# Patient Record
Sex: MALE | Race: WHITE | NOT HISPANIC OR LATINO | Employment: OTHER | URBAN - METROPOLITAN AREA
[De-identification: names, ages, dates, MRNs, and addresses within clinical notes are randomized per-mention and may not be internally consistent; named-entity substitution may affect disease eponyms.]

---

## 2020-10-09 ENCOUNTER — TRANSCRIBE ORDERS (OUTPATIENT)
Dept: ADMINISTRATIVE | Facility: HOSPITAL | Age: 80
End: 2020-10-09

## 2020-10-09 ENCOUNTER — LAB (OUTPATIENT)
Dept: LAB | Facility: HOSPITAL | Age: 80
End: 2020-10-09
Attending: INTERNAL MEDICINE
Payer: MEDICARE

## 2020-10-09 DIAGNOSIS — E78.2 MIXED HYPERLIPIDEMIA: ICD-10-CM

## 2020-10-09 DIAGNOSIS — M79.10 MYALGIA: ICD-10-CM

## 2020-10-09 DIAGNOSIS — Z12.5 SPECIAL SCREENING FOR MALIGNANT NEOPLASM OF PROSTATE: ICD-10-CM

## 2020-10-09 DIAGNOSIS — Z79.01 LONG TERM (CURRENT) USE OF ANTICOAGULANTS: Primary | ICD-10-CM

## 2020-10-09 DIAGNOSIS — Z79.01 LONG TERM (CURRENT) USE OF ANTICOAGULANTS: ICD-10-CM

## 2020-10-09 LAB
ALBUMIN SERPL BCP-MCNC: 3.5 G/DL (ref 3.4–4.8)
ALP SERPL-CCNC: 86.2 U/L (ref 10–129)
ALT SERPL W P-5'-P-CCNC: 71 U/L (ref 5–63)
ANION GAP SERPL CALCULATED.3IONS-SCNC: 9 MMOL/L (ref 4–13)
AST SERPL W P-5'-P-CCNC: 40 U/L (ref 15–41)
BACTERIA UR QL AUTO: ABNORMAL /HPF
BASOPHILS # BLD MANUAL: 0 THOUSAND/UL (ref 0–0.1)
BASOPHILS NFR MAR MANUAL: 0 % (ref 0–1)
BILIRUB SERPL-MCNC: 0.44 MG/DL (ref 0.3–1.2)
BILIRUB UR QL STRIP: NEGATIVE
BUN SERPL-MCNC: 17 MG/DL (ref 6–20)
CALCIUM SERPL-MCNC: 9 MG/DL (ref 8.4–10.2)
CHLORIDE SERPL-SCNC: 99 MMOL/L (ref 96–108)
CHOLEST SERPL-MCNC: 130 MG/DL
CLARITY UR: CLEAR
CO2 SERPL-SCNC: 27 MMOL/L (ref 22–33)
COLOR UR: YELLOW
CREAT SERPL-MCNC: 0.82 MG/DL (ref 0.5–1.2)
EOSINOPHIL # BLD MANUAL: 0 THOUSAND/UL (ref 0–0.4)
EOSINOPHIL NFR BLD MANUAL: 0 % (ref 0–6)
ERYTHROCYTE [DISTWIDTH] IN BLOOD BY AUTOMATED COUNT: 12.9 % (ref 11.6–15.1)
ERYTHROCYTE [SEDIMENTATION RATE] IN BLOOD: 32 MM/HOUR (ref 0–20)
GFR SERPL CREATININE-BSD FRML MDRD: 83 ML/MIN/1.73SQ M
GLUCOSE P FAST SERPL-MCNC: 105 MG/DL (ref 70–100)
GLUCOSE UR STRIP-MCNC: NEGATIVE MG/DL
HCT VFR BLD AUTO: 42 % (ref 36.5–49.3)
HDLC SERPL-MCNC: 19 MG/DL
HGB BLD-MCNC: 14 G/DL (ref 12–17)
HGB UR QL STRIP.AUTO: ABNORMAL
KETONES UR STRIP-MCNC: NEGATIVE MG/DL
LDLC SERPL CALC-MCNC: 85 MG/DL (ref 0–100)
LEUKOCYTE ESTERASE UR QL STRIP: ABNORMAL
LYMPHOCYTES # BLD AUTO: 1.24 THOUSAND/UL (ref 0.6–4.47)
LYMPHOCYTES # BLD AUTO: 11 % (ref 14–44)
MCH RBC QN AUTO: 29.6 PG (ref 26.8–34.3)
MCHC RBC AUTO-ENTMCNC: 33.3 G/DL (ref 31.4–37.4)
MCV RBC AUTO: 89 FL (ref 82–98)
METAMYELOCYTES NFR BLD MANUAL: 1 % (ref 0–1)
MONOCYTES # BLD AUTO: 1.58 THOUSAND/UL (ref 0–1.22)
MONOCYTES NFR BLD: 14 % (ref 4–12)
NEUTROPHILS # BLD MANUAL: 8.34 THOUSAND/UL (ref 1.85–7.62)
NEUTS BAND NFR BLD MANUAL: 2 % (ref 0–8)
NEUTS SEG NFR BLD AUTO: 72 % (ref 43–75)
NITRITE UR QL STRIP: NEGATIVE
NON-SQ EPI CELLS URNS QL MICRO: ABNORMAL /HPF
NONHDLC SERPL-MCNC: 111 MG/DL
PH UR STRIP.AUTO: 7 [PH]
PLATELET BLD QL SMEAR: ADEQUATE
PMV BLD AUTO: 11 FL (ref 8.9–12.7)
POTASSIUM SERPL-SCNC: 4.6 MMOL/L (ref 3.5–5)
PROT SERPL-MCNC: 6.3 G/DL (ref 6.4–8.3)
PROT UR STRIP-MCNC: ABNORMAL MG/DL
RBC # BLD AUTO: 4.73 MILLION/UL (ref 3.88–5.62)
RBC #/AREA URNS AUTO: ABNORMAL /HPF
RBC MORPH BLD: NORMAL
SODIUM SERPL-SCNC: 135 MMOL/L (ref 133–145)
SP GR UR STRIP.AUTO: 1.01 (ref 1–1.03)
T3 SERPL-MCNC: 0.8 NG/ML (ref 0.6–1.8)
TOTAL CELLS COUNTED SPEC: 100
TRIGL SERPL-MCNC: 128.3 MG/DL
TSH SERPL DL<=0.05 MIU/L-ACNC: 1.75 UIU/ML (ref 0.34–5.6)
UROBILINOGEN UR QL STRIP.AUTO: 0.2 E.U./DL
WBC # BLD AUTO: 11.27 THOUSAND/UL (ref 4.31–10.16)
WBC #/AREA URNS AUTO: ABNORMAL /HPF

## 2020-10-09 PROCEDURE — 86430 RHEUMATOID FACTOR TEST QUAL: CPT

## 2020-10-09 PROCEDURE — 80053 COMPREHEN METABOLIC PANEL: CPT

## 2020-10-09 PROCEDURE — 80061 LIPID PANEL: CPT

## 2020-10-09 PROCEDURE — 86038 ANTINUCLEAR ANTIBODIES: CPT

## 2020-10-09 PROCEDURE — 85007 BL SMEAR W/DIFF WBC COUNT: CPT

## 2020-10-09 PROCEDURE — 85027 COMPLETE CBC AUTOMATED: CPT

## 2020-10-09 PROCEDURE — 84443 ASSAY THYROID STIM HORMONE: CPT

## 2020-10-09 PROCEDURE — 84480 ASSAY TRIIODOTHYRONINE (T3): CPT

## 2020-10-09 PROCEDURE — 86618 LYME DISEASE ANTIBODY: CPT

## 2020-10-09 PROCEDURE — 81001 URINALYSIS AUTO W/SCOPE: CPT | Performed by: INTERNAL MEDICINE

## 2020-10-09 PROCEDURE — 85652 RBC SED RATE AUTOMATED: CPT

## 2020-10-09 PROCEDURE — 36415 COLL VENOUS BLD VENIPUNCTURE: CPT

## 2020-10-10 LAB — B BURGDOR IGG+IGM SER-ACNC: <0.91 ISR (ref 0–0.9)

## 2020-10-12 LAB
RHEUMATOID FACT SER QL LA: NEGATIVE
RYE IGE QN: NEGATIVE

## 2020-12-22 ENCOUNTER — TRANSCRIBE ORDERS (OUTPATIENT)
Dept: ADMINISTRATIVE | Facility: HOSPITAL | Age: 80
End: 2020-12-22

## 2020-12-22 ENCOUNTER — APPOINTMENT (OUTPATIENT)
Dept: LAB | Facility: HOSPITAL | Age: 80
End: 2020-12-22
Payer: MEDICARE

## 2020-12-22 ENCOUNTER — APPOINTMENT (OUTPATIENT)
Dept: CT IMAGING | Facility: HOSPITAL | Age: 80
End: 2020-12-22
Payer: MEDICARE

## 2020-12-22 DIAGNOSIS — C67.9 MALIGNANT NEOPLASM OF URINARY BLADDER, UNSPECIFIED SITE (HCC): Primary | ICD-10-CM

## 2020-12-22 DIAGNOSIS — C67.9 MALIGNANT NEOPLASM OF BLADDER, UNSPECIFIED (HCC): Primary | ICD-10-CM

## 2020-12-22 DIAGNOSIS — C67.9 MALIGNANT NEOPLASM OF URINARY BLADDER, UNSPECIFIED SITE (HCC): ICD-10-CM

## 2020-12-22 LAB
BUN SERPL-MCNC: 19 MG/DL (ref 6–20)
CREAT SERPL-MCNC: 0.99 MG/DL (ref 0.5–1.2)
GFR SERPL CREATININE-BSD FRML MDRD: 71 ML/MIN/1.73SQ M

## 2020-12-22 PROCEDURE — 84520 ASSAY OF UREA NITROGEN: CPT

## 2020-12-22 PROCEDURE — 36415 COLL VENOUS BLD VENIPUNCTURE: CPT

## 2020-12-22 PROCEDURE — 82565 ASSAY OF CREATININE: CPT

## 2020-12-29 ENCOUNTER — HOSPITAL ENCOUNTER (OUTPATIENT)
Dept: CT IMAGING | Facility: HOSPITAL | Age: 80
Discharge: HOME | End: 2020-12-29
Payer: MEDICARE

## 2020-12-29 DIAGNOSIS — C67.9 MALIGNANT NEOPLASM OF BLADDER, UNSPECIFIED (HCC): ICD-10-CM

## 2020-12-29 PROCEDURE — G1004 CDSM NDSC: HCPCS

## 2020-12-29 PROCEDURE — 74177 CT ABD & PELVIS W/CONTRAST: CPT

## 2020-12-29 RX ADMIN — IOHEXOL 100 ML: 350 INJECTION, SOLUTION INTRAVENOUS at 08:18

## 2021-01-26 ENCOUNTER — TRANSCRIBE ORDERS (OUTPATIENT)
Dept: ADMINISTRATIVE | Facility: HOSPITAL | Age: 81
End: 2021-01-26

## 2021-01-26 DIAGNOSIS — C67.9 MALIGNANT NEOPLASM OF URINARY BLADDER, UNSPECIFIED SITE (HCC): ICD-10-CM

## 2021-01-26 DIAGNOSIS — Z01.818 PREOP TESTING: ICD-10-CM

## 2021-01-26 DIAGNOSIS — D41.4 NEOPLASM OF UNCERTAIN BEHAVIOR OF BLADDER: Primary | ICD-10-CM

## 2021-01-29 ENCOUNTER — LAB (OUTPATIENT)
Dept: LAB | Facility: CLINIC | Age: 81
End: 2021-01-29
Payer: MEDICARE

## 2021-01-29 ENCOUNTER — APPOINTMENT (OUTPATIENT)
Dept: LAB | Facility: CLINIC | Age: 81
End: 2021-01-29
Payer: MEDICARE

## 2021-01-29 DIAGNOSIS — Z01.818 PREOP TESTING: ICD-10-CM

## 2021-01-29 DIAGNOSIS — U07.1 COVID-19: ICD-10-CM

## 2021-01-29 DIAGNOSIS — D41.4 NEOPLASM OF UNCERTAIN BEHAVIOR OF BLADDER: ICD-10-CM

## 2021-01-29 LAB
ANION GAP SERPL CALCULATED.3IONS-SCNC: 7 MMOL/L (ref 4–13)
ATRIAL RATE: 97 BPM
BUN SERPL-MCNC: 18 MG/DL (ref 5–25)
CALCIUM SERPL-MCNC: 9.3 MG/DL (ref 8.3–10.1)
CHLORIDE SERPL-SCNC: 96 MMOL/L (ref 100–108)
CO2 SERPL-SCNC: 30 MMOL/L (ref 21–32)
CREAT SERPL-MCNC: 0.98 MG/DL (ref 0.6–1.3)
ERYTHROCYTE [DISTWIDTH] IN BLOOD BY AUTOMATED COUNT: 12.7 % (ref 11.6–15.1)
GFR SERPL CREATININE-BSD FRML MDRD: 72 ML/MIN/1.73SQ M
GLUCOSE SERPL-MCNC: 95 MG/DL (ref 65–140)
HCT VFR BLD AUTO: 44.5 % (ref 36.5–49.3)
HGB BLD-MCNC: 14.6 G/DL (ref 12–17)
MCH RBC QN AUTO: 28.9 PG (ref 26.8–34.3)
MCHC RBC AUTO-ENTMCNC: 32.8 G/DL (ref 31.4–37.4)
MCV RBC AUTO: 88 FL (ref 82–98)
PLATELET # BLD AUTO: 251 THOUSANDS/UL (ref 149–390)
PMV BLD AUTO: 9.3 FL (ref 8.9–12.7)
POTASSIUM SERPL-SCNC: 4.2 MMOL/L (ref 3.5–5.3)
QRS AXIS: -63 DEGREES
QRSD INTERVAL: 130 MS
QT INTERVAL: 318 MS
QTC INTERVAL: 420 MS
RBC # BLD AUTO: 5.06 MILLION/UL (ref 3.88–5.62)
SODIUM SERPL-SCNC: 133 MMOL/L (ref 136–145)
T WAVE AXIS: 31 DEGREES
VENTRICULAR RATE: 105 BPM
WBC # BLD AUTO: 11.19 THOUSAND/UL (ref 4.31–10.16)

## 2021-01-29 PROCEDURE — U0005 INFEC AGEN DETEC AMPLI PROBE: HCPCS | Performed by: SPECIALIST

## 2021-01-29 PROCEDURE — U0003 INFECTIOUS AGENT DETECTION BY NUCLEIC ACID (DNA OR RNA); SEVERE ACUTE RESPIRATORY SYNDROME CORONAVIRUS 2 (SARS-COV-2) (CORONAVIRUS DISEASE [COVID-19]), AMPLIFIED PROBE TECHNIQUE, MAKING USE OF HIGH THROUGHPUT TECHNOLOGIES AS DESCRIBED BY CMS-2020-01-R: HCPCS | Performed by: SPECIALIST

## 2021-01-29 PROCEDURE — 85027 COMPLETE CBC AUTOMATED: CPT

## 2021-01-29 PROCEDURE — 36415 COLL VENOUS BLD VENIPUNCTURE: CPT

## 2021-01-29 PROCEDURE — 93005 ELECTROCARDIOGRAM TRACING: CPT

## 2021-01-29 PROCEDURE — 93010 ELECTROCARDIOGRAM REPORT: CPT | Performed by: INTERNAL MEDICINE

## 2021-01-29 PROCEDURE — 80048 BASIC METABOLIC PNL TOTAL CA: CPT

## 2021-01-29 RX ORDER — ASPIRIN 81 MG/1
81 TABLET ORAL DAILY
COMMUNITY
End: 2021-07-11

## 2021-01-29 RX ORDER — METOPROLOL TARTRATE 50 MG/1
50 TABLET, FILM COATED ORAL EVERY 12 HOURS SCHEDULED
COMMUNITY

## 2021-01-29 RX ORDER — OMEGA-3S/DHA/EPA/FISH OIL/D3 300MG-1000
400 CAPSULE ORAL DAILY
COMMUNITY
End: 2021-07-11

## 2021-01-29 RX ORDER — LISINOPRIL AND HYDROCHLOROTHIAZIDE 20; 12.5 MG/1; MG/1
1 TABLET ORAL DAILY
COMMUNITY
End: 2021-07-11

## 2021-01-29 RX ORDER — ATORVASTATIN CALCIUM 40 MG/1
40 TABLET, FILM COATED ORAL DAILY
COMMUNITY
End: 2021-07-11

## 2021-01-29 RX ORDER — FERROUS SULFATE 325(65) MG
325 TABLET ORAL
COMMUNITY

## 2021-01-29 NOTE — PRE-PROCEDURE INSTRUCTIONS
My Surgical Experience    The following information was developed to assist you to prepare for your operation  What do I need to do before coming to the hospital?   Arrange for a responsible person to drive you to and from the hospital    Arrange care for your children at home  Children are not allowed in the recovery areas of the hospital   Plan to wear clothing that is easy to put on and take off  If you are having shoulder surgery, wear a shirt that buttons or zippers in the front  Bathing  o Shower the evening before and the morning of your surgery with an antibacterial soap  Please refer to the Pre Op Showering Instructions for Surgery Patients Sheet   o Remove nail polish and all body piercing jewelry  o Do not shave any body part for at least 24 hours before surgery-this includes face, arms, legs and upper body  Food  o Nothing to eat or drink after midnight the night before your surgery  This includes candy and chewing gum  o Exception: If your surgery is after 12:00pm (noon), you may have clear liquids such as 7-Up®, ginger ale, apple or cranberry juice, Jell-O®, water, or clear broth until 8:00 am  o Do not drink milk or juice with pulp on the morning before surgery  o Do not drink alcohol 24 hours before surgery  Medicine  o Follow instructions you received from your surgeon about which medicines you may take on the day of surgery  o If instructed to take medicine on the morning of surgery, take pills with just a small sip of water  Call your prescribing doctor for specific infroamtion on what to do if you take insulin    What should I bring to the hospital?    Bring:  Karla Blanton or a walker, if you have them, for foot or knee surgery   A list of the daily medicines, vitamins, minerals, herbals and nutritional supplements you take   Include the dosages of medicines and the time you take them each day   Glasses, dentures or hearing aids   Minimal clothing; you will be wearing hospital sleepwear   Photo ID; required to verify your identity   If you have a Living Will or Power of , bring a copy of the documents   If you have an ostomy, bring an extra pouch and any supplies you use    Do not bring   Medicines or inhalers   Money, valuables or jewelry    What other information should I know about the day of surgery?  Notify your surgeons if you develop a cold, sore throat, cough, fever, rash or any other illness   Report to the Ambulatory Surgical/Same Day Surgery Unit   You will be instructed to stop at Registration only if you have not been pre-registered   Inform your  fi they do not stay that they will be asked by the staff to leave a phone number where they can be reached   Be available to be reached before surgery  In the event the operating room schedule changes, you may be asked to come in earlier or later than expected    *It is important to tell your doctor and others involved in your health care if you are taking or have been taking any non-prescription drugs, vitamins, minerals, herbals or other nutritional supplements  Any of these may interact with some food or medicines and cause a reaction      Pre-Surgery Instructions:   Medication Instructions    aspirin (ECOTRIN LOW STRENGTH) 81 mg EC tablet Instructed patient per Anesthesia Guidelines   atorvastatin (LIPITOR) 40 mg tablet Instructed patient per Anesthesia Guidelines   cholecalciferol (VITAMIN D3) 400 units tablet Instructed patient per Anesthesia Guidelines   ferrous sulfate 325 (65 Fe) mg tablet Instructed patient per Anesthesia Guidelines   lisinopril-hydrochlorothiazide (PRINZIDE,ZESTORETIC) 20-12 5 MG per tablet Instructed patient per Anesthesia Guidelines   metoprolol tartrate (LOPRESSOR) 50 mg tablet Instructed patient per Anesthesia Guidelines   Multiple Vitamins-Minerals (OCUVITE ADULT 50+ PO) Instructed patient per Anesthesia Guidelines      To take lisinopril and metoprolol neda caraballo  of surgery

## 2021-01-30 LAB — SARS-COV-2 RNA RESP QL NAA+PROBE: NEGATIVE

## 2021-02-03 ENCOUNTER — ANESTHESIA EVENT (OUTPATIENT)
Dept: PERIOP | Facility: HOSPITAL | Age: 81
End: 2021-02-03
Payer: MEDICARE

## 2021-02-03 NOTE — H&P
H&P Exam - Urology       Patient: Rancho Espinal   : 1940 Sex: male   MRN: [id number]     CSN: [id number]      History of Present Illness   HPI:  Rancho Espinal is a 79 y o  male who transferred to my practice from another urologist with longstanding history of bladder papillomas recently undergoing CT urogram confirming severe thickening of the right left and posterior wall consistent with invasive neoplasm limited flex cysto confirm no intraluminal lesions patient now to undergo exam under anesthesia TURBT bilateral retrograde pyelograms        Review of Systems:   Constitutional:  Negative for activity change, fever, chills and diaphoresis  HENT: Negative for hearing loss and trouble swallowing  Eyes: Negative for itching and visual disturbance  Respiratory: Negative for chest tightness and shortness of breath  Cardiovascular: Negative for chest pain, edema  Gastrointestinal: Negative for abdominal distention, na abdominal pain, constipation, diarrhea, Nausea and vomiting  Genitourinary: Negative for decreased urine volume, difficulty urinating, dysuria, enuresis, frequency, hematuria and urgency  Musculoskeletal: Negative for gait problem and myalgias  Neurological: Negative for dizziness and headaches  Hematological: Does not bruise/bleed easily         Historical Information   Past Medical History:   Diagnosis Date    Hypertension      Past Surgical History:   Procedure Laterality Date    APPENDECTOMY      CAROTID ARTERY ANGIOPLASTY Right     COLONOSCOPY      HERNIA REPAIR      JOINT REPLACEMENT  2017    right hip    TONSILLECTOMY       Social History   Social History     Substance and Sexual Activity   Alcohol Use Yes    Comment: occ     Social History     Substance and Sexual Activity   Drug Use Never     Social History     Tobacco Use   Smoking Status Former Smoker    Years: 25 00    Types: Cigarettes    Quit date: 1971    Years since quittin 0   Smokeless Tobacco Never Used     Family History: History reviewed  No pertinent family history  Meds/Allergies   No medications prior to admission  No Known Allergies    Objective   Vitals: Ht 5' 8" (1 727 m)   Wt 90 7 kg (200 lb)   BMI 30 41 kg/m²     Physical Exam:  General Alert awake   Normocephalic atraumatic PERRLA  Lungs clear bilaterally  Cardiac normal S1 normal S2  Abdomen soft, flank pain  Extremities no edema    No intake/output data recorded      Invasive Devices     None                     Lab Results: CBC:   Lab Results   Component Value Date    WBC 11 19 (H) 01/29/2021    HGB 14 6 01/29/2021    HCT 44 5 01/29/2021    MCV 88 01/29/2021     01/29/2021    MCH 28 9 01/29/2021    MCHC 32 8 01/29/2021    RDW 12 7 01/29/2021    MPV 9 3 01/29/2021     CMP:   Lab Results   Component Value Date    CL 96 (L) 01/29/2021    CO2 30 01/29/2021    BUN 18 01/29/2021    CREATININE 0 98 01/29/2021    CALCIUM 9 3 01/29/2021    AST 40 10/09/2020    ALT 71 (H) 10/09/2020    ALKPHOS 86 2 10/09/2020    EGFR 72 01/29/2021     Urinalysis:   Lab Results   Component Value Date    COLORU Yellow 10/09/2020    CLARITYU Clear 10/09/2020    SPECGRAV 1 015 10/09/2020    PHUR 7 0 10/09/2020    LEUKOCYTESUR 1+ (A) 10/09/2020    NITRITE Negative 10/09/2020    GLUCOSEU Negative 10/09/2020    KETONESU Negative 10/09/2020    BILIRUBINUR Negative 10/09/2020    BLOODU Trace-Intact (A) 10/09/2020     Urine Culture: No results found for: URINECX  PSA: No results found for: PSA        Assessment/ Plan:  Cysto TURBT bilateral retrograde pyelograms      Laly Chacon MD

## 2021-02-04 ENCOUNTER — APPOINTMENT (OUTPATIENT)
Dept: RADIOLOGY | Facility: HOSPITAL | Age: 81
End: 2021-02-04
Payer: MEDICARE

## 2021-02-04 ENCOUNTER — HOSPITAL ENCOUNTER (OUTPATIENT)
Facility: HOSPITAL | Age: 81
Setting detail: OUTPATIENT SURGERY
Discharge: HOME | End: 2021-02-04
Attending: SPECIALIST | Admitting: SPECIALIST
Payer: MEDICARE

## 2021-02-04 ENCOUNTER — ANESTHESIA (OUTPATIENT)
Dept: PERIOP | Facility: HOSPITAL | Age: 81
End: 2021-02-04
Payer: MEDICARE

## 2021-02-04 VITALS
RESPIRATION RATE: 18 BRPM | BODY MASS INDEX: 30.31 KG/M2 | SYSTOLIC BLOOD PRESSURE: 164 MMHG | DIASTOLIC BLOOD PRESSURE: 85 MMHG | HEART RATE: 92 BPM | WEIGHT: 200 LBS | TEMPERATURE: 96.8 F | OXYGEN SATURATION: 94 % | HEIGHT: 68 IN

## 2021-02-04 VITALS — HEART RATE: 145 BPM

## 2021-02-04 DIAGNOSIS — D41.4 NEOPLASM OF UNCERTAIN BEHAVIOR OF BLADDER: ICD-10-CM

## 2021-02-04 PROCEDURE — C1769 GUIDE WIRE: HCPCS | Performed by: SPECIALIST

## 2021-02-04 PROCEDURE — 88112 CYTOPATH CELL ENHANCE TECH: CPT | Performed by: PATHOLOGY

## 2021-02-04 PROCEDURE — 74420 UROGRAPHY RTRGR +-KUB: CPT

## 2021-02-04 RX ORDER — DEXAMETHASONE SODIUM PHOSPHATE 4 MG/ML
INJECTION, SOLUTION INTRA-ARTICULAR; INTRALESIONAL; INTRAMUSCULAR; INTRAVENOUS; SOFT TISSUE AS NEEDED
Status: DISCONTINUED | OUTPATIENT
Start: 2021-02-04 | End: 2021-02-04

## 2021-02-04 RX ORDER — FENTANYL CITRATE/PF 50 MCG/ML
25 SYRINGE (ML) INJECTION
Status: DISCONTINUED | OUTPATIENT
Start: 2021-02-04 | End: 2021-02-04 | Stop reason: HOSPADM

## 2021-02-04 RX ORDER — PROPOFOL 10 MG/ML
INJECTION, EMULSION INTRAVENOUS AS NEEDED
Status: DISCONTINUED | OUTPATIENT
Start: 2021-02-04 | End: 2021-02-04

## 2021-02-04 RX ORDER — LIDOCAINE HYDROCHLORIDE 10 MG/ML
INJECTION, SOLUTION EPIDURAL; INFILTRATION; INTRACAUDAL; PERINEURAL AS NEEDED
Status: DISCONTINUED | OUTPATIENT
Start: 2021-02-04 | End: 2021-02-04

## 2021-02-04 RX ORDER — KETOROLAC TROMETHAMINE 30 MG/ML
30 INJECTION, SOLUTION INTRAMUSCULAR; INTRAVENOUS ONCE
Status: COMPLETED | OUTPATIENT
Start: 2021-02-04 | End: 2021-02-04

## 2021-02-04 RX ORDER — FENTANYL CITRATE 50 UG/ML
INJECTION, SOLUTION INTRAMUSCULAR; INTRAVENOUS AS NEEDED
Status: DISCONTINUED | OUTPATIENT
Start: 2021-02-04 | End: 2021-02-04

## 2021-02-04 RX ORDER — METOPROLOL TARTRATE 5 MG/5ML
INJECTION INTRAVENOUS AS NEEDED
Status: DISCONTINUED | OUTPATIENT
Start: 2021-02-04 | End: 2021-02-04

## 2021-02-04 RX ORDER — ONDANSETRON 2 MG/ML
INJECTION INTRAMUSCULAR; INTRAVENOUS AS NEEDED
Status: DISCONTINUED | OUTPATIENT
Start: 2021-02-04 | End: 2021-02-04

## 2021-02-04 RX ORDER — SODIUM CHLORIDE 9 MG/ML
20 INJECTION, SOLUTION INTRAVENOUS CONTINUOUS
Status: DISCONTINUED | OUTPATIENT
Start: 2021-02-04 | End: 2021-02-04 | Stop reason: HOSPADM

## 2021-02-04 RX ORDER — GLYCINE 1.5 G/100ML
SOLUTION IRRIGATION AS NEEDED
Status: DISCONTINUED | OUTPATIENT
Start: 2021-02-04 | End: 2021-02-04 | Stop reason: HOSPADM

## 2021-02-04 RX ORDER — SODIUM CHLORIDE 9 MG/ML
125 INJECTION, SOLUTION INTRAVENOUS CONTINUOUS
Status: DISCONTINUED | OUTPATIENT
Start: 2021-02-04 | End: 2021-02-04 | Stop reason: SDUPTHER

## 2021-02-04 RX ORDER — LEVOFLOXACIN 5 MG/ML
500 INJECTION, SOLUTION INTRAVENOUS ONCE
Status: COMPLETED | OUTPATIENT
Start: 2021-02-04 | End: 2021-02-04

## 2021-02-04 RX ADMIN — PHENYLEPHRINE HYDROCHLORIDE 100 MCG: 10 INJECTION INTRAVENOUS at 09:26

## 2021-02-04 RX ADMIN — PROPOFOL 170 MG: 10 INJECTION, EMULSION INTRAVENOUS at 08:46

## 2021-02-04 RX ADMIN — FENTANYL CITRATE 75 MCG: 50 INJECTION, SOLUTION INTRAMUSCULAR; INTRAVENOUS at 08:40

## 2021-02-04 RX ADMIN — METOROPROLOL TARTRATE 5 MG: 5 INJECTION, SOLUTION INTRAVENOUS at 08:57

## 2021-02-04 RX ADMIN — FENTANYL CITRATE 25 MCG: 50 INJECTION, SOLUTION INTRAMUSCULAR; INTRAVENOUS at 10:19

## 2021-02-04 RX ADMIN — KETOROLAC TROMETHAMINE 30 MG: 30 INJECTION, SOLUTION INTRAMUSCULAR at 07:50

## 2021-02-04 RX ADMIN — PHENYLEPHRINE HYDROCHLORIDE 50 MCG: 10 INJECTION INTRAVENOUS at 08:59

## 2021-02-04 RX ADMIN — LIDOCAINE HYDROCHLORIDE 50 MG: 10 INJECTION, SOLUTION EPIDURAL; INFILTRATION; INTRACAUDAL; PERINEURAL at 08:46

## 2021-02-04 RX ADMIN — ONDANSETRON 4 MG: 2 INJECTION INTRAMUSCULAR; INTRAVENOUS at 08:58

## 2021-02-04 RX ADMIN — PHENYLEPHRINE HYDROCHLORIDE 50 MCG: 10 INJECTION INTRAVENOUS at 09:43

## 2021-02-04 RX ADMIN — LEVOFLOXACIN 500 MG: 5 INJECTION, SOLUTION INTRAVENOUS at 08:38

## 2021-02-04 RX ADMIN — FENTANYL CITRATE 25 MCG: 50 INJECTION, SOLUTION INTRAMUSCULAR; INTRAVENOUS at 08:54

## 2021-02-04 RX ADMIN — LEVOFLOXACIN 500 MG: 5 INJECTION, SOLUTION INTRAVENOUS at 08:12

## 2021-02-04 RX ADMIN — FENTANYL CITRATE 25 MCG: 50 INJECTION, SOLUTION INTRAMUSCULAR; INTRAVENOUS at 10:24

## 2021-02-04 RX ADMIN — SODIUM CHLORIDE: 0.9 INJECTION, SOLUTION INTRAVENOUS at 08:35

## 2021-02-04 RX ADMIN — DEXAMETHASONE SODIUM PHOSPHATE 4 MG: 4 INJECTION, SOLUTION INTRA-ARTICULAR; INTRALESIONAL; INTRAMUSCULAR; INTRAVENOUS; SOFT TISSUE at 08:58

## 2021-02-04 NOTE — ANESTHESIA PREPROCEDURE EVALUATION
Procedure:  CYSTOSCOPY, TRANSURETHRAL RESECTION OF BLADDER TUMOR (TURBT), RETROGRADES (Bilateral Bladder)    Relevant Problems   No relevant active problems        Physical Exam    Airway    Mallampati score: II  TM Distance: >3 FB  Neck ROM: full     Dental   upper dentures and lower dentures,     Cardiovascular  Rhythm: regular, Rate: normal, Cardiovascular exam normal    Pulmonary  Pulmonary exam normal Breath sounds clear to auscultation,     Other Findings        Anesthesia Plan  ASA Score- 2     Anesthesia Type- general with ASA Monitors  Additional Monitors:   Airway Plan: LMA  Plan Factors-Exercise tolerance (METS): <4 METS  Chart reviewed  EKG reviewed  Patient summary reviewed  Patient is not a current smoker  Induction- intravenous  Postoperative Plan- Plan for postoperative opioid use  Informed Consent- Anesthetic plan and risks discussed with patient  I personally reviewed this patient with the CRNA  Discussed and agreed on the Anesthesia Plan with the CRNA  Saima Console

## 2021-02-04 NOTE — PERIOPERATIVE NURSING NOTE
Received patient to SDS from PACU, alert, VSS, tolerating po fluids  IV infusing well  Connell catheter in place, draining punch-colored urine without clots  Mild 2/10 pressure at Connell site noted by patient  Pt will remain in SDS until 1300 per Dr Mario Gross, check urine color during stay

## 2021-02-04 NOTE — DISCHARGE INSTRUCTIONS
#1 no heavy straining or lifting above 10 pounds for 2 weeks    #2 call office fevers, chills, or worsening blood in the urine  #3 Patient has follow-up Dr Errol Fuller Wednesday October 10th 10:30 a m  Discuss Foley removal Marthe Mcburney Antario M D  [address] [facility] [place] office  [address] [place] [address]  [phone]  8:30 AM to 4:30 PM  Monday through Friday    [facility] [place] office  [phone] [address]  [address]  [address] [facility] [place], [address] [place] [address]  [phone]  1:00 to 5:00 PM  Wednesday

## 2021-02-04 NOTE — PERIOPERATIVE NURSING NOTE
Dr Donal Brown saw patient at this time, aware of urine color and amount  Dr Madrigalstated ok for discharge  Instructed patient to keep fluid intake up and monior urine output  Patient educated on alanis care, legstrap secure

## 2021-02-04 NOTE — PERIOPERATIVE NURSING NOTE
Urine flowing well into Connell bag but much darker red  Dr Hailey Sanchez informed and will come to assess once out of operating room  Pt uncomfortable secondary to chronic left hip pain; will move to recliner after Dr Hailey Sanchez her to see patient

## 2021-02-04 NOTE — PROGRESS NOTES
Progress Note - Urology      Patient: Az Giron   : 1940 Sex: male   MRN: [id number]     CSN: [id number]  Unit/Bed#: OR POOL     Patient preop holding area  Aware risk of anesthesia infection bleeding       Objective   Vitals: BP (!) 175/98   Pulse 64   Temp (!) 96 8 °F (36 °C) (Temporal)   Resp 20   Ht 5' 8" (1 727 m)   Wt 90 7 kg (200 lb)   SpO2 95%   BMI 30 41 kg/m²     No intake/output data recorded        Physical Exam:   General Alert awake   Normocephalic atraumatic PERRLA  Lungs clear bilaterally  Cardiac normal S1 normal S2  Abdomen soft, flank pain  Extremities no edema      Lab Results: CBC:   Lab Results   Component Value Date    WBC 11 19 (H) 2021    HGB 14 6 2021    HCT 44 5 2021    MCV 88 2021     2021    MCH 28 9 2021    MCHC 32 8 2021    RDW 12 7 2021    MPV 9 3 2021     CMP:   Lab Results   Component Value Date    CL 96 (L) 2021    CO2 30 2021    BUN 18 2021    CREATININE 0 98 2021    CALCIUM 9 3 2021    AST 40 10/09/2020    ALT 71 (H) 10/09/2020    ALKPHOS 86 2 10/09/2020    EGFR 72 2021     Urinalysis:   Lab Results   Component Value Date    COLORU Yellow 10/09/2020    CLARITYU Clear 10/09/2020    SPECGRAV 1 015 10/09/2020    PHUR 7 0 10/09/2020    LEUKOCYTESUR 1+ (A) 10/09/2020    NITRITE Negative 10/09/2020    GLUCOSEU Negative 10/09/2020    KETONESU Negative 10/09/2020    BILIRUBINUR Negative 10/09/2020    BLOODU Trace-Intact (A) 10/09/2020     Urine Culture: No results found for: URINECX  PSA: No results found for: PSA      Assessment/ Plan:  Cysto TURBT bilateral retrogrades          Joseluis Skelton MD

## 2021-02-04 NOTE — ANESTHESIA POSTPROCEDURE EVALUATION
Post-Op Assessment Note    CV Status:  Stable  Pain Score: 2    Pain management: adequate     Mental Status:  Alert   Hydration Status:  Stable   PONV Controlled:  None   Airway Patency:  Patent   Two or more mitigation strategies used for obstructive sleep apnea   Post Op Vitals Reviewed: Yes      Staff: CRNA         No complications documented      BP  163/78   Temp  97   Pulse  99   Resp   18   SpO2   100

## 2021-02-04 NOTE — PERIOPERATIVE NURSING NOTE
Patient c/o greater than level 10 left hip pain   Anesthesia called   Medicated with Torodol as ordered by Dr Mare Chawla  Repositioned patient on side  Appears more comfortable and sleeping at this time

## 2021-02-04 NOTE — OP NOTE
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]  Procedure  Cysto DVIU left retrograde pyelogram    Postop  Specimen(s):  ID Type Source Tests Collected by Time Destination   1 : Cystoscopic urine for cytology  Urine Urine, Cystoscopic CYTOLOGY, URINE Kristen Barber MD 2021 0900        Estimated Blood Loss:   0 mL    Drains:  Urethral Catheter Coude 20 Fr  (Active)   Number of days: 0       Anesthesia Type:   General    Operative Indications:  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]  This 79-year-old male transferred to my practice from another urologist with longstanding history of bladder papillomas undergoing recent workup including CT urogram confirming thickened bladder wall right side which radiologist feels it is bladder cancer office cysto confirmed multiple strictures confirming no intraluminal lesions  Patient now to undergo exam under anesthesia bilateral retrograde pyelograms possible TURBT if lesions found      Operative Findings:  1   Significant urethral stricture disease able to pass 25 Western Shreya scope with difficulty DVIU done throughout the entire urethral length  Left retrograde confirming normal filling and drainage  Bladder lumen confirm no lesions noted multiple cellules and left posterior wall diverticulum no lesion to biopsy thickened bladder wall could be consistent with bladder outlet obstruction from urethral stricture disease  Right orifice not found  Connell left to bag drainage mild hematuria         Complications:   None    Procedure and Technique:  After the patient identified in the holding area consent signed he was placed op suite after anesthesia induced he was placed thigh position draped prep standard fashion time-out performed 22 Indonesian cystoscope passed through the meatus with some difficulty due to urethral stricture disease the scope was bowling GERD into the mid urethra with moderate stricture noted scope removed the urethra tome placed and the stricture was opened there was also multiple strictures in the proximal and mid bulbar urethra opened  The scope was removed and the 25 Indonesian cystoscope passed again with some difficulty due to the tight urethra  Posterior the confirmed a 2 5-3 cm bilobar prostatic urethra as the scope was inserted into the bladder lumen on view of the bladder with 30 and 70 degree lens the left orifice was noted to be patulous and lateral consistent with TUR defect in the past the right orifice could not be found there was significant cellules and posterior wall bladder right diverticulum no intraluminal lesions were found  Five Indonesian catheter placed to the left orifice and left retrograde pyelogram confirmed filling and drainage with no intraluminal defect right orifice could not be found  The scope was removed attempts at passing a 24 Western Shreya resectoscope were initially tried with severe tightness of the urethra still noted multiple attempts at opening the entire urethra performed passing the scope again was unsuccessful in light of that the scope was removed and a 20 Western Shreya coude catheter was placed and left to bag drainage time of dictation urine is mild hematuria    Discussion with patient postop still has significant left pelvic pain and is scheduled for left hip replacement awaiting bone scan if patient has arthritis inflammatory disease would DC Connell in 1 week and schedule repeat CT scan of area in 3 months if patient has metastatic deposit then either repeat attempted TURBT of the right wall can be scheduled in a few weeks   I was present for the entire procedure    Patient Disposition:  PACU     SIGNATURE: Ban Islas MD  DATE: February 4, 2021  TIME: 10:28 AM

## 2021-02-17 ENCOUNTER — HOSPITAL ENCOUNTER (OUTPATIENT)
Dept: RADIOLOGY | Facility: HOSPITAL | Age: 81
Discharge: HOME | End: 2021-02-17
Attending: SPECIALIST
Payer: MEDICARE

## 2021-02-17 DIAGNOSIS — C67.9 MALIGNANT NEOPLASM OF URINARY BLADDER, UNSPECIFIED SITE (HCC): ICD-10-CM

## 2021-02-17 PROCEDURE — G1004 CDSM NDSC: HCPCS

## 2021-02-17 PROCEDURE — 78306 BONE IMAGING WHOLE BODY: CPT

## 2021-02-17 PROCEDURE — A9503 TC99M MEDRONATE: HCPCS

## 2021-02-20 ENCOUNTER — IMMUNIZATIONS (OUTPATIENT)
Dept: FAMILY MEDICINE CLINIC | Facility: HOSPITAL | Age: 81
End: 2021-02-20

## 2021-02-20 DIAGNOSIS — Z23 ENCOUNTER FOR IMMUNIZATION: Primary | ICD-10-CM

## 2021-02-20 PROCEDURE — 0001A SARS-COV-2 / COVID-19 MRNA VACCINE (PFIZER-BIONTECH) 30 MCG: CPT

## 2021-02-20 PROCEDURE — 91300 SARS-COV-2 / COVID-19 MRNA VACCINE (PFIZER-BIONTECH) 30 MCG: CPT

## 2021-03-14 ENCOUNTER — IMMUNIZATIONS (OUTPATIENT)
Dept: FAMILY MEDICINE CLINIC | Facility: HOSPITAL | Age: 81
End: 2021-03-14

## 2021-03-14 DIAGNOSIS — Z23 ENCOUNTER FOR IMMUNIZATION: Primary | ICD-10-CM

## 2021-03-14 PROCEDURE — 0002A SARS-COV-2 / COVID-19 MRNA VACCINE (PFIZER-BIONTECH) 30 MCG: CPT

## 2021-03-14 PROCEDURE — 91300 SARS-COV-2 / COVID-19 MRNA VACCINE (PFIZER-BIONTECH) 30 MCG: CPT

## 2021-05-27 ENCOUNTER — TELEPHONE (OUTPATIENT)
Dept: UROLOGY | Facility: AMBULATORY SURGERY CENTER | Age: 81
End: 2021-05-27

## 2021-05-27 NOTE — TELEPHONE ENCOUNTER
Patient scheduled with Ángel Morris as new patient for 6/23/21  He is have a lot of pain with urination and frequency  He stated he needs help  He wants to know if he should go to emergency room  He has not been able to eat today and is very nauseas

## 2021-05-27 NOTE — TELEPHONE ENCOUNTER
Called and spoke to patient at this time     Patient states he has not eaten in 2 days and has pain with urination and frequency   He states he has nausea as well     He states he did call his pcp and is awaiting call     Advised we would advise for visit with pcp or if he feels it is emergent and since he has not eaten in a couple days he should probably be seen at the ER if symptoms get worse    Advised unfortunately we do not have anything sooner here in our office so he wants to make sure he is following up with his pcp until he is seen here     He verbalized understanding and is thankful for call

## 2021-07-11 ENCOUNTER — HOSPITAL ENCOUNTER (EMERGENCY)
Facility: HOSPITAL | Age: 81
Discharge: HOME | End: 2021-07-11
Attending: EMERGENCY MEDICINE
Payer: MEDICARE

## 2021-07-11 ENCOUNTER — APPOINTMENT (EMERGENCY)
Dept: CT IMAGING | Facility: HOSPITAL | Age: 81
End: 2021-07-11
Payer: MEDICARE

## 2021-07-11 VITALS
DIASTOLIC BLOOD PRESSURE: 79 MMHG | RESPIRATION RATE: 20 BRPM | TEMPERATURE: 98.4 F | HEART RATE: 74 BPM | WEIGHT: 200 LBS | HEIGHT: 67 IN | SYSTOLIC BLOOD PRESSURE: 178 MMHG | OXYGEN SATURATION: 95 % | BODY MASS INDEX: 31.39 KG/M2

## 2021-07-11 DIAGNOSIS — N39.0 COMPLICATED URINARY TRACT INFECTION: Primary | ICD-10-CM

## 2021-07-11 LAB
ALBUMIN SERPL BCP-MCNC: 3 G/DL (ref 3.5–5)
ALP SERPL-CCNC: 143 U/L (ref 46–116)
ALT SERPL W P-5'-P-CCNC: 16 U/L (ref 12–78)
ANION GAP SERPL CALCULATED.3IONS-SCNC: 10 MMOL/L (ref 4–13)
APTT PPP: 41 SECONDS (ref 23–37)
AST SERPL W P-5'-P-CCNC: 31 U/L (ref 5–45)
BACTERIA UR QL AUTO: ABNORMAL /HPF
BASOPHILS # BLD AUTO: 0.15 THOUSANDS/ΜL (ref 0–0.1)
BASOPHILS NFR BLD AUTO: 1 % (ref 0–1)
BILIRUB SERPL-MCNC: 0.4 MG/DL (ref 0.2–1)
BILIRUB UR QL STRIP: NEGATIVE
BUN SERPL-MCNC: 13 MG/DL (ref 5–25)
CALCIUM ALBUM COR SERPL-MCNC: 9.5 MG/DL (ref 8.3–10.1)
CALCIUM SERPL-MCNC: 8.7 MG/DL (ref 8.3–10.1)
CHLORIDE SERPL-SCNC: 103 MMOL/L (ref 100–108)
CLARITY UR: ABNORMAL
CO2 SERPL-SCNC: 25 MMOL/L (ref 21–32)
COLOR UR: YELLOW
CREAT SERPL-MCNC: 1.17 MG/DL (ref 0.6–1.3)
EOSINOPHIL # BLD AUTO: 0.49 THOUSAND/ΜL (ref 0–0.61)
EOSINOPHIL NFR BLD AUTO: 4 % (ref 0–6)
ERYTHROCYTE [DISTWIDTH] IN BLOOD BY AUTOMATED COUNT: 14.9 % (ref 11.6–15.1)
GFR SERPL CREATININE-BSD FRML MDRD: 58 ML/MIN/1.73SQ M
GLUCOSE SERPL-MCNC: 108 MG/DL (ref 65–140)
GLUCOSE UR STRIP-MCNC: NEGATIVE MG/DL
HCT VFR BLD AUTO: 30.1 % (ref 36.5–49.3)
HGB BLD-MCNC: 9.6 G/DL (ref 12–17)
HGB UR QL STRIP.AUTO: ABNORMAL
IMM GRANULOCYTES # BLD AUTO: 0.09 THOUSAND/UL (ref 0–0.2)
IMM GRANULOCYTES NFR BLD AUTO: 1 % (ref 0–2)
INR PPP: 1.2 (ref 0.84–1.19)
KETONES UR STRIP-MCNC: NEGATIVE MG/DL
LACTATE SERPL-SCNC: 1.3 MMOL/L (ref 0.5–2)
LEUKOCYTE ESTERASE UR QL STRIP: ABNORMAL
LYMPHOCYTES # BLD AUTO: 0.78 THOUSANDS/ΜL (ref 0.6–4.47)
LYMPHOCYTES NFR BLD AUTO: 7 % (ref 14–44)
MCH RBC QN AUTO: 27.8 PG (ref 26.8–34.3)
MCHC RBC AUTO-ENTMCNC: 31.9 G/DL (ref 31.4–37.4)
MCV RBC AUTO: 87 FL (ref 82–98)
MONOCYTES # BLD AUTO: 1.04 THOUSAND/ΜL (ref 0.17–1.22)
MONOCYTES NFR BLD AUTO: 9 % (ref 4–12)
NEUTROPHILS # BLD AUTO: 9.53 THOUSANDS/ΜL (ref 1.85–7.62)
NEUTS SEG NFR BLD AUTO: 78 % (ref 43–75)
NITRITE UR QL STRIP: POSITIVE
NON-SQ EPI CELLS URNS QL MICRO: ABNORMAL /HPF
NRBC BLD AUTO-RTO: 0 /100 WBCS
PH UR STRIP.AUTO: 5.5 [PH] (ref 4.5–8)
PLATELET # BLD AUTO: 236 THOUSANDS/UL (ref 149–390)
PMV BLD AUTO: 9.3 FL (ref 8.9–12.7)
POTASSIUM SERPL-SCNC: 4 MMOL/L (ref 3.5–5.3)
PROT SERPL-MCNC: 7.1 G/DL (ref 6.4–8.2)
PROT UR STRIP-MCNC: ABNORMAL MG/DL
PROTHROMBIN TIME: 15.4 SECONDS (ref 11.6–14.5)
RBC # BLD AUTO: 3.45 MILLION/UL (ref 3.88–5.62)
RBC #/AREA URNS AUTO: ABNORMAL /HPF
SODIUM SERPL-SCNC: 138 MMOL/L (ref 136–145)
SP GR UR STRIP.AUTO: 1.02 (ref 1–1.03)
UROBILINOGEN UR QL STRIP.AUTO: 0.2 E.U./DL
WBC # BLD AUTO: 12.08 THOUSAND/UL (ref 4.31–10.16)
WBC #/AREA URNS AUTO: ABNORMAL /HPF

## 2021-07-11 PROCEDURE — 96375 TX/PRO/DX INJ NEW DRUG ADDON: CPT

## 2021-07-11 PROCEDURE — 99284 EMERGENCY DEPT VISIT MOD MDM: CPT

## 2021-07-11 PROCEDURE — 81001 URINALYSIS AUTO W/SCOPE: CPT

## 2021-07-11 PROCEDURE — 96365 THER/PROPH/DIAG IV INF INIT: CPT

## 2021-07-11 PROCEDURE — 83605 ASSAY OF LACTIC ACID: CPT | Performed by: EMERGENCY MEDICINE

## 2021-07-11 PROCEDURE — 96361 HYDRATE IV INFUSION ADD-ON: CPT

## 2021-07-11 PROCEDURE — G1004 CDSM NDSC: HCPCS

## 2021-07-11 PROCEDURE — 85730 THROMBOPLASTIN TIME PARTIAL: CPT | Performed by: EMERGENCY MEDICINE

## 2021-07-11 PROCEDURE — 80053 COMPREHEN METABOLIC PANEL: CPT | Performed by: EMERGENCY MEDICINE

## 2021-07-11 PROCEDURE — 36415 COLL VENOUS BLD VENIPUNCTURE: CPT | Performed by: EMERGENCY MEDICINE

## 2021-07-11 PROCEDURE — 74177 CT ABD & PELVIS W/CONTRAST: CPT

## 2021-07-11 PROCEDURE — 85610 PROTHROMBIN TIME: CPT | Performed by: EMERGENCY MEDICINE

## 2021-07-11 PROCEDURE — 99285 EMERGENCY DEPT VISIT HI MDM: CPT | Performed by: EMERGENCY MEDICINE

## 2021-07-11 PROCEDURE — 87077 CULTURE AEROBIC IDENTIFY: CPT

## 2021-07-11 PROCEDURE — 87040 BLOOD CULTURE FOR BACTERIA: CPT | Performed by: EMERGENCY MEDICINE

## 2021-07-11 PROCEDURE — 85025 COMPLETE CBC W/AUTO DIFF WBC: CPT | Performed by: EMERGENCY MEDICINE

## 2021-07-11 PROCEDURE — 87186 SC STD MICRODIL/AGAR DIL: CPT

## 2021-07-11 PROCEDURE — 87086 URINE CULTURE/COLONY COUNT: CPT

## 2021-07-11 RX ORDER — HYDROMORPHONE HCL/PF 1 MG/ML
0.2 SYRINGE (ML) INJECTION ONCE
Status: COMPLETED | OUTPATIENT
Start: 2021-07-11 | End: 2021-07-11

## 2021-07-11 RX ORDER — CEPHALEXIN 500 MG/1
500 CAPSULE ORAL 3 TIMES DAILY
Qty: 30 CAPSULE | Refills: 0 | Status: SHIPPED | OUTPATIENT
Start: 2021-07-11 | End: 2021-07-21

## 2021-07-11 RX ORDER — OXYCODONE HYDROCHLORIDE AND ACETAMINOPHEN 5; 325 MG/1; MG/1
1 TABLET ORAL ONCE
Status: COMPLETED | OUTPATIENT
Start: 2021-07-11 | End: 2021-07-11

## 2021-07-11 RX ORDER — ONDANSETRON 2 MG/ML
4 INJECTION INTRAMUSCULAR; INTRAVENOUS ONCE
Status: COMPLETED | OUTPATIENT
Start: 2021-07-11 | End: 2021-07-11

## 2021-07-11 RX ORDER — CEPHALEXIN 250 MG/1
500 CAPSULE ORAL ONCE
Status: COMPLETED | OUTPATIENT
Start: 2021-07-11 | End: 2021-07-11

## 2021-07-11 RX ORDER — OXYCODONE HYDROCHLORIDE AND ACETAMINOPHEN 5; 325 MG/1; MG/1
1 TABLET ORAL EVERY 6 HOURS PRN
Qty: 15 TABLET | Refills: 0 | Status: SHIPPED | OUTPATIENT
Start: 2021-07-11 | End: 2021-07-21

## 2021-07-11 RX ADMIN — CEPHALEXIN 500 MG: 250 CAPSULE ORAL at 05:04

## 2021-07-11 RX ADMIN — ONDANSETRON 4 MG: 2 INJECTION INTRAMUSCULAR; INTRAVENOUS at 03:40

## 2021-07-11 RX ADMIN — CEFTRIAXONE SODIUM 1000 MG: 10 INJECTION, POWDER, FOR SOLUTION INTRAVENOUS at 04:21

## 2021-07-11 RX ADMIN — IOHEXOL 100 ML: 350 INJECTION, SOLUTION INTRAVENOUS at 04:08

## 2021-07-11 RX ADMIN — SODIUM CHLORIDE 500 ML: 0.9 INJECTION, SOLUTION INTRAVENOUS at 03:28

## 2021-07-11 RX ADMIN — HYDROMORPHONE HYDROCHLORIDE 0.2 MG: 1 INJECTION, SOLUTION INTRAMUSCULAR; INTRAVENOUS; SUBCUTANEOUS at 03:41

## 2021-07-11 RX ADMIN — OXYCODONE HYDROCHLORIDE AND ACETAMINOPHEN 1 TABLET: 5; 325 TABLET ORAL at 05:04

## 2021-07-11 NOTE — ED PROVIDER NOTES
History  Chief Complaint   Patient presents with    Back Pain     lower back pain begining a few nights ago getting progressively worse      59-year-old male presents to the emergency department with low back pain  He explains that "on May 23rd I was supposed to come here" for evaluation because of a "leak in my penis "  He describes evaluation being rescheduled and then having outpatient blood work performed and repeated after which he was prompted to go immediately to the emergency department by his PCP  He was admitted to [facility] on June 10th with acute renal insufficiency felt to be secondary to obstruction  He had bilateral nephrostomy tubes placed and reports that he was feeling well with only some mild soreness in the back upon discharge  He relates that over time the lower back has become more sore and dramatically worse tonight  He suspects that this may be from accidental tugging on the tubes with movement but appreciated no relief upon taking 4 doses of Excedrin tonight or 3 doses of a mild pain medication (name unknown) he explains his PCP Dr Jonna Rowan prescribed for him  He denies having had any nausea, vomiting, fever or difficulty with bowel movements  He has experienced intermittent dysuria and relates that he still voids a small quantity of urine through the urethra  He has not appreciated blood in this since the nephrostomy tubes were 1st placed  He reports having had a few urinary procedures which went smoothly over the years  Early this year he underwent a procedure performed by Dr Barbara Schilling and he expresses frustration that following this he has had intermittent urinary incontinence  He is uncertain as to the details on procedures or what anatomical abnormalities are still present  He does have an upcoming appointment with the [facility] urologist whom he intends to switch his care to  Operative note from February 4th reviewed  Cystoscopy performed by Dr Madrigal  Patient identified to have urethral stricture  He had had history of bladder papillomas previously and on a CT urogram was appreciated to have bladder wall thickening which radiologist had been suspicious for bladder cancer  No masses were identified on cystoscopy   CT scan revealed bilateral hydronephrosis as well as bladder mass  7/15 appt scheduled w/ [facility] LEATHA Linares            Prior to Admission Medications   Prescriptions Last Dose Informant Patient Reported? Taking? Multiple Vitamins-Minerals (OCUVITE ADULT 50+ PO)   Yes No   Sig: Take by mouth   ferrous sulfate 325 (65 Fe) mg tablet   Yes No   Sig: Take 325 mg by mouth daily with breakfast   metoprolol tartrate (LOPRESSOR) 50 mg tablet   Yes No   Sig: Take 50 mg by mouth every 12 (twelve) hours      Facility-Administered Medications: None       Past Medical History:   Diagnosis Date    Hypertension        Past Surgical History:   Procedure Laterality Date    APPENDECTOMY      CAROTID ARTERY ANGIOPLASTY Right     COLONOSCOPY      FL RETROGRADE PYELOGRAM  2021    HERNIA REPAIR      JOINT REPLACEMENT  2017    right hip    ND CYSTOURETHROSCOPY,FULGUR >5 CM LESN Bilateral 2021    Procedure: CYSTOSCOPY, LEFT RETROGRADES, DVIU;  Surgeon: Luna Narayan MD;  Location: [address] [place] [address];  Service: Urology    TONSILLECTOMY         No family history on file  I have reviewed and agree with the history as documented  E-Cigarette/Vaping    E-Cigarette Use Never User      E-Cigarette/Vaping Substances     Social History     Tobacco Use    Smoking status: Former Smoker     Years: 25 00     Types: Cigarettes     Quit date: 1971     Years since quittin 4    Smokeless tobacco: Never Used   Vaping Use    Vaping Use: Never used   Substance Use Topics    Alcohol use: Yes     Comment: occ    Drug use: Never       Review of Systems   Constitutional: Negative for fever     Gastrointestinal: Negative for abdominal pain, constipation, nausea and vomiting  Genitourinary: Positive for dysuria (occasional)  Negative for frequency, hematuria, penile pain and testicular pain  Skin: Negative for rash  All other systems reviewed and are negative  Physical Exam  Physical Exam  Vitals and nursing note reviewed  Constitutional:       Appearance: Normal appearance  HENT:      Head: Normocephalic  Cardiovascular:      Rate and Rhythm: Normal rate and regular rhythm  Pulmonary:      Effort: Pulmonary effort is normal       Breath sounds: Normal breath sounds  Abdominal:      General: Bowel sounds are normal       Palpations: Abdomen is soft  Comments: Nephrostomy tubes in placed  Bandages CDI  Insertion site without erythema/ discharge  Mild tn around entrance sites  Both bags contain translucent yellow urine  Musculoskeletal:         General: Normal range of motion  Comments: No midline T or L spine tn  Tn across low lumbar region   Skin:     General: Skin is warm and dry  Neurological:      General: No focal deficit present  Mental Status: He is alert and oriented to person, place, and time     Psychiatric:         Mood and Affect: Mood normal          Vital Signs  ED Triage Vitals   Temperature Pulse Respirations Blood Pressure SpO2   07/11/21 0217 07/11/21 0217 07/11/21 0217 07/11/21 0330 07/11/21 0217   98 4 °F (36 9 °C) 87 20 (!) 178/79 94 %      Temp Source Heart Rate Source Patient Position - Orthostatic VS BP Location FiO2 (%)   07/11/21 0217 07/11/21 0217 07/11/21 0217 07/11/21 0217 --   Oral Monitor Sitting Left arm       Pain Score       07/11/21 0217       9           Vitals:    07/11/21 0217 07/11/21 0330   BP:  (!) 178/79   Pulse: 87 74   Patient Position - Orthostatic VS: Sitting Lying         Visual Acuity      ED Medications  Medications   sodium chloride 0 9 % bolus 500 mL (0 mL Intravenous Stopped 7/11/21 0428)   HYDROmorphone (DILAUDID) injection 0 2 mg (0 2 mg Intravenous Given 7/11/21 0341)   ondansetron (ZOFRAN) injection 4 mg (4 mg Intravenous Given 7/11/21 0340)   ceftriaxone (ROCEPHIN) 1 g/50 mL in dextrose IVPB (0 mg Intravenous Stopped 7/11/21 0451)   iohexol (OMNIPAQUE) 350 MG/ML injection (SINGLE-DOSE) 100 mL (100 mL Intravenous Given 7/11/21 0408)   oxyCODONE-acetaminophen (PERCOCET) 5-325 mg per tablet 1 tablet (1 tablet Oral Given 7/11/21 0504)   cephalexin (KEFLEX) capsule 500 mg (500 mg Oral Given 7/11/21 0504)       Diagnostic Studies  Results Reviewed     Procedure Component Value Units Date/Time    Blood culture #1 [[id number]] Collected: 07/11/21 0421    Lab Status: Preliminary result Specimen: Blood from Arm, Left Updated: 07/11/21 1301     Blood Culture Received in Microbiology Lab  Culture in Progress  Blood culture #2 [[id number]] Collected: 07/11/21 0421    Lab Status: Preliminary result Specimen: Blood from Arm, Right Updated: 07/11/21 1301     Blood Culture Received in Microbiology Lab  Culture in Progress  Urine Microscopic [[id number]]  (Abnormal) Collected: 07/11/21 0332    Lab Status: Final result Specimen: Urine, Clean Catch Updated: 07/11/21 0412     RBC, UA 4-10 /hpf      WBC, UA 30-50 /hpf      Epithelial Cells Occasional /hpf      Bacteria, UA Innumerable /hpf     Urine culture [[id number]] Collected: 07/11/21 0332    Lab Status:  In process Specimen: Urine, Clean Catch Updated: 07/11/21 0412    Protime-INR [[id number]]  (Abnormal) Collected: 07/11/21 0325    Lab Status: Final result Specimen: Blood from Arm, Left Updated: 07/11/21 0400     Protime 15 4 seconds      INR 1 20    APTT [[id number]]  (Abnormal) Collected: 07/11/21 0325    Lab Status: Final result Specimen: Blood from Arm, Left Updated: 07/11/21 0400     PTT 41 seconds     Lactic acid [[id number]]  (Normal) Collected: 07/11/21 0325    Lab Status: Final result Specimen: Blood from Arm, Left Updated: 07/11/21 0357     LACTIC ACID 1 3 mmol/L     Narrative:      Result may be elevated if tourniquet was used during collection      Comprehensive metabolic panel [[id number]]  (Abnormal) Collected: 07/11/21 0325    Lab Status: Final result Specimen: Blood from Arm, Left Updated: 07/11/21 0354     Sodium 138 mmol/L      Potassium 4 0 mmol/L      Chloride 103 mmol/L      CO2 25 mmol/L      ANION GAP 10 mmol/L      BUN 13 mg/dL      Creatinine 1 17 mg/dL      Glucose 108 mg/dL      Calcium 8 7 mg/dL      Corrected Calcium 9 5 mg/dL      AST 31 U/L      ALT 16 U/L      Alkaline Phosphatase 143 U/L      Total Protein 7 1 g/dL      Albumin 3 0 g/dL      Total Bilirubin 0 40 mg/dL      eGFR 58 ml/min/1 73sq m     Narrative:      [place] guidelines for Chronic Kidney Disease (CKD):     Stage 1 with normal or high GFR (GFR > 90 mL/min/1 73 square meters)    Stage 2 Mild CKD (GFR = 60-89 mL/min/1 73 square meters)    Stage 3A Moderate CKD (GFR = 45-59 mL/min/1 73 square meters)    Stage 3B Moderate CKD (GFR = 30-44 mL/min/1 73 square meters)    Stage 4 Severe CKD (GFR = 15-29 mL/min/1 73 square meters)    Stage 5 End Stage CKD (GFR <15 mL/min/1 73 square meters)  Note: GFR calculation is accurate only with a steady state creatinine    CBC and differential [[id number]]  (Abnormal) Collected: 07/11/21 0325    Lab Status: Final result Specimen: Blood from Arm, Left Updated: 07/11/21 0337     WBC 12 08 Thousand/uL      RBC 3 45 Million/uL      Hemoglobin 9 6 g/dL      Hematocrit 30 1 %      MCV 87 fL      MCH 27 8 pg      MCHC 31 9 g/dL      RDW 14 9 %      MPV 9 3 fL      Platelets 866 Thousands/uL      nRBC 0 /100 WBCs      Neutrophils Relative 78 %      Immat GRANS % 1 %      Lymphocytes Relative 7 %      Monocytes Relative 9 %      Eosinophils Relative 4 %      Basophils Relative 1 %      Neutrophils Absolute 9 53 Thousands/µL      Immature Grans Absolute 0 09 Thousand/uL      Lymphocytes Absolute 0 78 Thousands/µL      Monocytes Absolute 1 04 Thousand/µL      Eosinophils Absolute 0 49 Thousand/µL      Basophils Absolute 0 15 Thousands/µL     Urine Macroscopic, POC [[id number]]  (Abnormal) Collected: 07/11/21 0332    Lab Status: Final result Specimen: Urine Updated: 07/11/21 0333     Color, UA Yellow     Clarity, UA Cloudy     pH, UA 5 5     Leukocytes, UA Small     Nitrite, UA Positive     Protein,  (2+) mg/dl      Glucose, UA Negative mg/dl      Ketones, UA Negative mg/dl      Urobilinogen, UA 0 2 E U /dl      Bilirubin, UA Negative     Blood, UA Moderate     Specific [place], UA 1 020    Narrative:      CLINITEK RESULT                 CT abdomen pelvis with contrast   Final Result by Esteban Lucero DO (07/11 1005)   1  Somewhat limited examination due to beam hardening artifact from bilateral hip arthroplasty  Pelvic structures not well visualized  2   Bilateral nephrostomy catheters in satisfactory position  No evidence of hydronephrosis  3   Abnormal appearance of the urinary bladder, most compatible with the patient's history of high-grade urothelial neoplasm as per the urine cytology results in Epic       4   Interval development of significant retroperitoneal, para-aortic and bilateral internal/external iliac adenopathy  Findings most compatible with metastatic disease  5  Interval development of sacral insufficiency fracture  This is likely the source of the patient's low back pain  6   Interval development of small right pleural effusion  The study was marked in [facility] for immediate notification  Workstation performed: [id number]                    Procedures  Procedures         ED Course  ED Course as of Jul 11 2214   Tom Wang Jul 11, 2021   0184 UA reveals presence of leukocyte esterase and nitrites  Urinalysis from June 10th does not include presence of nitrates  Urine culture from June 3rd reveals presence of Staph epidermidis  Urine cultures from nephrostomy tubes placed on June 11th are without growth    Today's findings with increased discomfort, nitrite presence and mild leukocytosis are concerning for UTI       0453 Patient ambulating out of hallway having abruptly requested that his IV be removed so that he could leave  I walked down the rowe with him explaining that he has a bad urinary tract infection and that this is the likely cause of his bad pain  He expressed frustration that he had not received any pain medication  I indicated that this had been ordered and upon review of the chart now note that it was definitively given  I explained that I could certainly order more pain medication and that IV antibiotics had also been ordered  He question when these would be given and additionally indicated that he would not be staying in the hospital as he needs to do something today  I questioned if he had had his CT scan imaging which I explained would be important to identify whether he had any abscesses or other structural abnormalities which could affect his care  He indicated that he had the imaging  No report has been rendered  At this time patient is agreeable to returning to his room to receive oral dose of antibiotic and discharge papers with further prescription for these as well as analgesics  MDM    Disposition  Final diagnoses:   Complicated urinary tract infection     Time reflects when diagnosis was documented in both MDM as applicable and the Disposition within this note     Time User Action Codes Description Comment    7/11/2021  4:57 AM Maylin MASSEY Add [T74 4] Complicated urinary tract infection       ED Disposition     ED Disposition Condition Date/Time Comment    Discharge Stable Sun Jul 11, 2021  4:56 AM Verónica Foster discharge to home/self care              Follow-up Information     Follow up With Specialties Details Why Contact Info    Keri Anderson MD Internal Medicine Schedule an appointment as soon as possible for a visit   [address] J[facility] Nelson Physician Assistant Go to  Your appointment as scheduled for July 15th  Call the office later today or tomorrow to inform them of today's visit to the emergency department and diagnosis of urinary tract infection  [address]  [address] [facility]            Discharge Medication List as of 7/11/2021  5:03 AM      START taking these medications    Details   cephalexin (KEFLEX) 500 mg capsule Take 1 capsule (500 mg total) by mouth 3 (three) times a day for 10 days, Starting Sun 7/11/2021, Until Wed 7/21/2021, Normal      oxyCODONE-acetaminophen (PERCOCET) 5-325 mg per tablet Take 1 tablet by mouth every 6 (six) hours as needed for moderate pain or severe pain for up to 10 daysMax Daily Amount: 4 tablets, Starting Sun 7/11/2021, Until Wed 7/21/2021 at 2359, Normal         CONTINUE these medications which have NOT CHANGED    Details   ferrous sulfate 325 (65 Fe) mg tablet Take 325 mg by mouth daily with breakfast, Historical Med      metoprolol tartrate (LOPRESSOR) 50 mg tablet Take 50 mg by mouth every 12 (twelve) hours, Historical Med      Multiple Vitamins-Minerals (OCUVITE ADULT 50+ PO) Take by mouth, Historical Med           No discharge procedures on file      PDMP Review       Value Time User    PDMP Reviewed  Yes 7/11/2021  4:57 AM Nathan James MD          ED Provider  Electronically Signed by           Nathan James MD  07/11/21 8171

## 2021-07-11 NOTE — ED NOTES
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

## 2021-07-11 NOTE — DISCHARGE INSTRUCTIONS
Take cephalexin antibiotic orally 3 times daily to treat urinary tract infection  You may take oxycodone/acetaminophen as directed to help with pain  Return to the emergency department if you experience increased pain, develops fever, are unable to keep your medication down as a result vomiting or have other concerns  Keep your appointment with the urology office for July 15th and notified the staff sooner of your visit today and diagnosis of urinary tract infection

## 2021-07-14 LAB
BACTERIA UR CULT: ABNORMAL
BACTERIA UR CULT: ABNORMAL

## 2021-07-14 RX ORDER — NITROFURANTOIN 25; 75 MG/1; MG/1
100 CAPSULE ORAL EVERY 12 HOURS SCHEDULED
Qty: 10 CAPSULE | Refills: 0 | Status: SHIPPED | OUTPATIENT
Start: 2021-07-14 | End: 2021-07-19

## 2021-07-14 NOTE — RESULT ENCOUNTER NOTE
Patient was discharged on Keflex  Verified patient identity (Name and )  Spoke to patient regarding results  Patient states he is feeling improved  Patient has an appointment tomorrow scheduled with urology for follow-up  I offered patient an additional antibiotic coverage he states that he will  the medication but will discuss tomorrow with his urologist  I provided patient with strict RTER precautions  I advised patient follow-up with PCP in 24-48 hours  Patient verbalized understanding

## 2021-07-16 LAB
BACTERIA BLD CULT: NORMAL
BACTERIA BLD CULT: NORMAL

## (undated) DEVICE — CYSTOSCOPY PACK: Brand: CONVERTORS

## (undated) DEVICE — STANDARD SURGICAL GOWN, L: Brand: CONVERTORS

## (undated) DEVICE — SPONGE STICK WITH PVP-I: Brand: KENDALL

## (undated) DEVICE — STORZ LOOP ELECTRODE 24 FR

## (undated) DEVICE — CATH FOLEY 22FR 5ML 2 WAY SILICONE ELASTIMER

## (undated) DEVICE — GUIDEWIRE STRGHT TIP 0.038 IN SOLO PLUS

## (undated) DEVICE — CYSTO TUBING TUR Y IRRIGATION

## (undated) DEVICE — POUCH UR CATCHER STERILE

## (undated) DEVICE — BAG URINE DRAINAGE 2000ML ANTI RFLX LF

## (undated) DEVICE — GROUNDING PAD UNIVERSAL SLW

## (undated) DEVICE — CATH FOLEY 20FR 5ML 2 WAY SILICONE ELASTIMER

## (undated) DEVICE — LUBRICANT SURGILUBE TUBE 4 OZ  FLIP TOP

## (undated) DEVICE — Device

## (undated) DEVICE — GLOVE SRG BIOGEL 8

## (undated) DEVICE — CATH FOLEY 24FR 5ML 2 WAY SILICONE ELASTIMER

## (undated) DEVICE — RADIOLOGY STERILE LABELS: Brand: CENTURION

## (undated) DEVICE — EVACUATOR BLADDER ELLIK DISP STRL

## (undated) DEVICE — URETERAL CATHETER POLLACK OPEN ENDED 5FR